# Patient Record
Sex: FEMALE | Race: BLACK OR AFRICAN AMERICAN | NOT HISPANIC OR LATINO | Employment: FULL TIME | URBAN - METROPOLITAN AREA
[De-identification: names, ages, dates, MRNs, and addresses within clinical notes are randomized per-mention and may not be internally consistent; named-entity substitution may affect disease eponyms.]

---

## 2023-11-03 ENCOUNTER — TELEPHONE (OUTPATIENT)
Dept: SURGERY | Facility: CLINIC | Age: 23
End: 2023-11-03
Payer: MEDICAID

## 2023-11-03 NOTE — TELEPHONE ENCOUNTER
Pt rescheduled to Nov. 14 at 1:15 pm with Noel Licona NP    ----- Message from David Carrion sent at 11/3/2023  7:59 AM CDT -----  Name Of Caller: Francisca        Provider Name:  Noel Licona         Does patient feel the need to be seen today? no        Relationship to the Pt?:  patient        Contact Preference?:   [phone]        What is the nature of the call?: Patient has an appointment scheduled for today Friday 11- at  9:15am, patient states that she needs to cancel this appointment and get it rescheduled.

## 2023-11-13 PROBLEM — N60.82 SEBACEOUS CYST OF BREAST, LEFT: Status: ACTIVE | Noted: 2023-11-13

## 2023-11-13 PROBLEM — N63.25 BREAST LUMP ON LEFT SIDE AT 9 O'CLOCK POSITION: Status: ACTIVE | Noted: 2023-11-13

## 2023-11-14 ENCOUNTER — OFFICE VISIT (OUTPATIENT)
Dept: SURGERY | Facility: CLINIC | Age: 23
End: 2023-11-14
Payer: MEDICAID

## 2023-11-14 VITALS — HEIGHT: 67 IN | WEIGHT: 201 LBS | BODY MASS INDEX: 31.55 KG/M2

## 2023-11-14 DIAGNOSIS — N60.89 SEBACEOUS CYST OF SKIN OF BREAST, UNSPECIFIED LATERALITY: ICD-10-CM

## 2023-11-14 DIAGNOSIS — N60.82 SEBACEOUS CYST OF BREAST, LEFT: ICD-10-CM

## 2023-11-14 DIAGNOSIS — N63.25 BREAST LUMP ON LEFT SIDE AT 9 O'CLOCK POSITION: Primary | ICD-10-CM

## 2023-11-14 PROCEDURE — 1160F PR REVIEW ALL MEDS BY PRESCRIBER/CLIN PHARMACIST DOCUMENTED: ICD-10-PCS | Mod: CPTII,,, | Performed by: NURSE PRACTITIONER

## 2023-11-14 PROCEDURE — 99999 PR PBB SHADOW E&M-EST. PATIENT-LVL II: CPT | Mod: PBBFAC,,, | Performed by: NURSE PRACTITIONER

## 2023-11-14 PROCEDURE — 1159F PR MEDICATION LIST DOCUMENTED IN MEDICAL RECORD: ICD-10-PCS | Mod: CPTII,,, | Performed by: NURSE PRACTITIONER

## 2023-11-14 PROCEDURE — 1159F MED LIST DOCD IN RCRD: CPT | Mod: CPTII,,, | Performed by: NURSE PRACTITIONER

## 2023-11-14 PROCEDURE — 3008F BODY MASS INDEX DOCD: CPT | Mod: CPTII,,, | Performed by: NURSE PRACTITIONER

## 2023-11-14 PROCEDURE — 99203 PR OFFICE/OUTPT VISIT, NEW, LEVL III, 30-44 MIN: ICD-10-PCS | Mod: S$PBB,,, | Performed by: NURSE PRACTITIONER

## 2023-11-14 PROCEDURE — 99212 OFFICE O/P EST SF 10 MIN: CPT | Mod: PBBFAC,PN | Performed by: NURSE PRACTITIONER

## 2023-11-14 PROCEDURE — 1160F RVW MEDS BY RX/DR IN RCRD: CPT | Mod: CPTII,,, | Performed by: NURSE PRACTITIONER

## 2023-11-14 PROCEDURE — 3008F PR BODY MASS INDEX (BMI) DOCUMENTED: ICD-10-PCS | Mod: CPTII,,, | Performed by: NURSE PRACTITIONER

## 2023-11-14 PROCEDURE — 99203 OFFICE O/P NEW LOW 30 MIN: CPT | Mod: S$PBB,,, | Performed by: NURSE PRACTITIONER

## 2023-11-14 PROCEDURE — 99999 PR PBB SHADOW E&M-EST. PATIENT-LVL II: ICD-10-PCS | Mod: PBBFAC,,, | Performed by: NURSE PRACTITIONER

## 2023-11-14 RX ORDER — SULFAMETHOXAZOLE AND TRIMETHOPRIM 800; 160 MG/1; MG/1
1 TABLET ORAL 2 TIMES DAILY
Qty: 20 TABLET | Refills: 0 | Status: SHIPPED | OUTPATIENT
Start: 2023-11-14

## 2023-11-14 NOTE — ASSESSMENT & PLAN NOTE
We discussed the natural course of sebaceous cysts.  She knows that even if removed - the cyst can recur.  This risk is lower if removed when not inflamed/infected.  She knows that she can elect to have this area excised at her convenience.  We discussed the need to drain this area from time to time if it becomes infected.

## 2023-11-14 NOTE — PROGRESS NOTES
[facility]  MD Noel Crespo, NP-C      Date of Service: 11/14/2023    Chief Complaint:   Francisca Hein is a 23 y.o. female presenting today for left breast mass that she first noticed 3 weeks ago. Originally it was tender. The area no longer bothers her and it has actually decreased in size. She's had several bumps under her left breast in the past that flare up from time to time.   Her imaging showed benign changes consistent with a sebaceous cyst  and close follow up has been recommended.    History of Present Illness:   Mrs. Francisca Hein presents on 11/14/2023 due to left breast mass  that on imaging was found to be consistent with a sebaceous cyst. and the recommendation was made to follow the area conservatively. MD:::Jaden Ulloa MD    Past Medical History:   Diagnosis Date    Breast lump on left side at 9 o'clock position 11/13/2023    PCOS (polycystic ovarian syndrome)     Sebaceous cyst of breast, left 11/13/2023      History reviewed. No pertinent surgical history.     Current Outpatient Medications:     sulfamethoxazole-trimethoprim 800-160mg (BACTRIM DS) 800-160 mg Tab, Take 1 tablet by mouth 2 (two) times daily., Disp: 20 tablet, Rfl: 0   Review of patient's allergies indicates:  No Known Allergies   Social History     Tobacco Use    Smoking status: Every Day     Types: Vaping w/o nicotine    Smokeless tobacco: Never   Substance Use Topics    Alcohol use: Not Currently      Family History   Problem Relation Age of Onset    Breast cancer Neg Hx     Ovarian cancer Neg Hx         Review of Systems   Integumentary:  Positive for breast mass. Negative for color change, rash, mole/lesion, breast discharge and breast tenderness.   Breast: Positive for mass.Negative for tenderness       Physical Exam   Constitutional: She appears well-developed. She is cooperative.   HENT:   Head: Normocephalic.   Cardiovascular:  Normal rate and regular  rhythm.            Pulmonary/Chest: She exhibits no tenderness and no bony tenderness. Right breast exhibits no mass, no nipple discharge, no skin change and no tenderness. Left breast exhibits mass (in the 8- 9 o'clock N 12 position close to the sternum a flat nodule noted with thickened skin. prominent follicle not noted but superficial and likely a sebaceous cyst that's not inflamed). Left breast exhibits no nipple discharge, no skin change and no tenderness.       Abdominal: Soft. Normal appearance.   Musculoskeletal: Lymphadenopathy:      Upper Body:      Right upper body: No supraclavicular or axillary adenopathy.      Left upper body: No supraclavicular or axillary adenopathy.     Neurological: She is alert.   Skin: No rash noted.        Mammogram: 11/2/2023- No suspicious mammographic abnormality is identified.     Ultrasound: Left 11/2/2023- Ultrasound demonstrates a 2 x 0.6 x 1.1 cm complex fluid collection likely representing a sebaceous cyst in the area of the patient's complaint.  Consider evaluation for drainage.     Assessment and Plan:      1. Breast lump on left side at 9 o'clock position  Assessment & Plan:  Her AOC is consistent with a sebaceous cyst with NEM. The area is currently not inflamed. I will start her on a round of Bactrim DS and re-evaluate in 2 weeks. She feels the area is improving. We will follow her conservatively for now. She understands the importance of monthly self-breast exams and knows to notify me of any and all changes as they occur. She was also seen an examined by Dr. Paredes and he agrees with the plan.       2. Sebaceous cyst of breast, left  Assessment & Plan:  We discussed the natural course of sebaceous cysts.  She knows that even if removed - the cyst can recur.  This risk is lower if removed when not inflamed/infected.  She knows that she can elect to have this area excised at her convenience.  We discussed the need to drain this area from time to time if it becomes  infected.        Other orders  -     sulfamethoxazole-trimethoprim 800-160mg (BACTRIM DS) 800-160 mg Tab; Take 1 tablet by mouth 2 (two) times daily.  Dispense: 20 tablet; Refill: 0         Medical Decision Making:  It is my impression that this patient suffers all conditions contained in this medical document.  Each of these conditions did affect our plan of care and my medical decision making today.  It is my opinion that the medical decision making concerning this patient was of moderate difficulty based on the aforementioned conditions.  Any further recommendations will be communicated to the patient by me.  I have reviewed and verified her allergies, list of medications, medical and surgical histories, social history, and a pertinent review of symptoms.      Follow up:  2 weeks and prn    For:  Physical Examination
